# Patient Record
Sex: FEMALE | URBAN - METROPOLITAN AREA
[De-identification: names, ages, dates, MRNs, and addresses within clinical notes are randomized per-mention and may not be internally consistent; named-entity substitution may affect disease eponyms.]

---

## 2023-05-05 ENCOUNTER — OFFICE VISIT (OUTPATIENT)
Dept: URGENT CARE | Facility: CLINIC | Age: 48
End: 2023-05-05
Payer: OTHER GOVERNMENT

## 2023-05-05 VITALS
HEIGHT: 67 IN | RESPIRATION RATE: 18 BRPM | HEART RATE: 97 BPM | WEIGHT: 240 LBS | OXYGEN SATURATION: 100 % | TEMPERATURE: 99 F | BODY MASS INDEX: 37.67 KG/M2 | DIASTOLIC BLOOD PRESSURE: 109 MMHG | SYSTOLIC BLOOD PRESSURE: 161 MMHG

## 2023-05-05 DIAGNOSIS — R51.9 ACUTE NONINTRACTABLE HEADACHE, UNSPECIFIED HEADACHE TYPE: ICD-10-CM

## 2023-05-05 DIAGNOSIS — Z76.0 MEDICATION REFILL: ICD-10-CM

## 2023-05-05 DIAGNOSIS — I10 HYPERTENSION, UNSPECIFIED TYPE: Primary | ICD-10-CM

## 2023-05-05 PROCEDURE — 99204 OFFICE O/P NEW MOD 45 MIN: CPT | Mod: S$GLB,,, | Performed by: PHYSICIAN ASSISTANT

## 2023-05-05 PROCEDURE — 99204 PR OFFICE/OUTPT VISIT, NEW, LEVL IV, 45-59 MIN: ICD-10-PCS | Mod: S$GLB,,, | Performed by: PHYSICIAN ASSISTANT

## 2023-05-05 RX ORDER — LISINOPRIL AND HYDROCHLOROTHIAZIDE 10; 12.5 MG/1; MG/1
1 TABLET ORAL DAILY
Qty: 30 TABLET | Refills: 0 | Status: SHIPPED | OUTPATIENT
Start: 2023-05-05

## 2023-05-05 RX ORDER — LISINOPRIL AND HYDROCHLOROTHIAZIDE 10; 12.5 MG/1; MG/1
1 TABLET ORAL DAILY
Qty: 14 TABLET | Refills: 0 | Status: SHIPPED | OUTPATIENT
Start: 2023-05-05 | End: 2023-05-05

## 2023-05-05 NOTE — PROGRESS NOTES
"Subjective:      Patient ID: Brooke Sifuentes is a 47 y.o. female.    Vitals:  height is 5' 7" (1.702 m) and weight is 108.9 kg (240 lb). Her oral temperature is 98.7 °F (37.1 °C). Her blood pressure is 161/109 (abnormal) and her pulse is 97. Her respiration is 18 and oxygen saturation is 100%.     Chief Complaint: Headache    Pt states that "she was experiencing ankle swelling and edema."    Headache   This is a new problem. The problem occurs intermittently. The problem has been unchanged. The pain quality is not similar to prior headaches. The quality of the pain is described as aching. The pain is at a severity of 0/10. The patient is experiencing no pain. Associated symptoms include ear pain. Nothing aggravates the symptoms. She has tried nothing for the symptoms. The treatment provided no relief.     HENT:  Positive for ear pain.    Neurological:  Positive for headaches.    Objective:     Physical Exam    Assessment:     No diagnosis found.    Plan:       There are no diagnoses linked to this encounter.                "

## 2023-05-05 NOTE — PROGRESS NOTES
"Subjective:      Patient ID: Brooke Sifuentes is a 47 y.o. female.    Vitals:  height is 5' 7" (1.702 m) and weight is 108.9 kg (240 lb). Her oral temperature is 98.7 °F (37.1 °C). Her blood pressure is 161/109 (abnormal) and her pulse is 97. Her respiration is 18 and oxygen saturation is 100%.     Chief Complaint: Headache    Patient is a 47-year-old female who presents with headache that started today.  She is a history of hypertension.  She is down here visiting from [place] and left her blood pressure medicines at home.  She states that she is been eating increased amount of salt and had poor water intake.  She reports mild swelling to bilateral lower extremities.  She denies visual changes.  She denies chest pain or trouble breathing.  She reports she is on lisinopril/HCTZ. She is requesting a refill of her medications.      Constitution: Negative for chills and fever.   HENT:  Negative for sore throat.    Cardiovascular:  Positive for leg swelling. Negative for chest trauma and chest pain.   Eyes:  Negative for double vision and blurred vision.   Respiratory:  Negative for cough.    Gastrointestinal:  Negative for abdominal pain, nausea, vomiting and diarrhea.   Neurological:  Positive for headaches. Negative for dizziness, numbness and tingling.    Objective:     Physical Exam   Constitutional: She is oriented to person, place, and time. She appears well-developed. She is cooperative. She does not appear ill. No distress.   HENT:   Head: Normocephalic and atraumatic.   Ears:   Right Ear: External ear normal.   Left Ear: External ear normal.   Nose: Nose normal. No rhinorrhea. Right sinus exhibits no maxillary sinus tenderness and no frontal sinus tenderness. Left sinus exhibits no maxillary sinus tenderness and no frontal sinus tenderness.   Mouth/Throat: Uvula is midline, oropharynx is clear and moist and mucous membranes are normal. No trismus in the jaw. No posterior oropharyngeal edema.   Eyes: " Conjunctivae and lids are normal.   Neck: Phonation normal. No neck rigidity present.   Cardiovascular: Normal rate, regular rhythm and normal heart sounds.   Pulmonary/Chest: Effort normal and breath sounds normal. No respiratory distress. She has no decreased breath sounds. She has no rhonchi.   Abdominal: Normal appearance.   Musculoskeletal: Normal range of motion.         General: No deformity. Normal range of motion.      Comments: 1+ pitting edema to bilateral lower extremities. No erythema. No calf tenderness.     Neurological: She is alert and oriented to person, place, and time. She exhibits normal muscle tone. Coordination normal.   Skin: Skin is warm, dry, intact and not pale.   Psychiatric: Her speech is normal and behavior is normal. Judgment and thought content normal.   Nursing note and vitals reviewed.    Assessment:     1. Hypertension, unspecified type    2. Acute nonintractable headache, unspecified headache type    3. Medication refill        Plan:       Hypertension, unspecified type  -     lisinopriL-hydrochlorothiazide (PRINZIDE,ZESTORETIC) 10-12.5 mg per tablet; Take 1 tablet by mouth once daily.  Dispense: 30 tablet; Refill: 0    Acute nonintractable headache, unspecified headache type    Medication refill  -     lisinopriL-hydrochlorothiazide (PRINZIDE,ZESTORETIC) 10-12.5 mg per tablet; Take 1 tablet by mouth once daily.  Dispense: 30 tablet; Refill: 0    Other orders  -     Discontinue: lisinopriL-hydrochlorothiazide (PRINZIDE,ZESTORETIC) 10-12.5 mg per tablet; Take 1 tablet by mouth once daily.  Dispense: 14 tablet; Refill: 0          Medical Decision Making:   History:   Old Medical Records: I decided to obtain old medical records.  Urgent Care Management:  Urgent evaluation of a 47-year-old female who presents for medication refill.  She states she is been out of her medications for approximately 1 week as she is visiting from [place].  She states she had a mild headache this  morning and her blood pressure was elevated.  She is been noncompliant with a low-sodium diet.  She denies any chest pain or shortness of breath.  She denies any visual changes.  Will resume her blood pressure medications and discussed diet with patient.  She voiced understanding.

## 2023-05-05 NOTE — PATIENT INSTRUCTIONS
Resume your medications as prescribed.  Eat a low-sodium diet.  Stay well hydrated.  Follow up with your primary care provider when you return home.